# Patient Record
Sex: FEMALE | Race: WHITE | URBAN - METROPOLITAN AREA
[De-identification: names, ages, dates, MRNs, and addresses within clinical notes are randomized per-mention and may not be internally consistent; named-entity substitution may affect disease eponyms.]

---

## 2017-07-25 ENCOUNTER — OFFICE VISIT (OUTPATIENT)
Dept: INTERNAL MEDICINE CLINIC | Age: 22
End: 2017-07-25

## 2017-07-25 VITALS
BODY MASS INDEX: 23.95 KG/M2 | WEIGHT: 158 LBS | DIASTOLIC BLOOD PRESSURE: 70 MMHG | HEART RATE: 83 BPM | RESPIRATION RATE: 12 BRPM | SYSTOLIC BLOOD PRESSURE: 102 MMHG | OXYGEN SATURATION: 99 % | TEMPERATURE: 98 F | HEIGHT: 68 IN

## 2017-07-25 DIAGNOSIS — Z13.9 SCREENING: ICD-10-CM

## 2017-07-25 DIAGNOSIS — G89.29 CHRONIC MIDLINE LOW BACK PAIN WITHOUT SCIATICA: ICD-10-CM

## 2017-07-25 DIAGNOSIS — M54.50 CHRONIC MIDLINE LOW BACK PAIN WITHOUT SCIATICA: ICD-10-CM

## 2017-07-25 DIAGNOSIS — G43.109 MIGRAINE WITH AURA AND WITHOUT STATUS MIGRAINOSUS, NOT INTRACTABLE: Primary | ICD-10-CM

## 2017-07-25 DIAGNOSIS — Z83.3 FAMILY HISTORY OF DIABETES MELLITUS: ICD-10-CM

## 2017-07-25 LAB
ALBUMIN SERPL-MCNC: 4.6 G/DL (ref 3.4–5)
ALP BLD-CCNC: 51 U/L (ref 40–129)
ALT SERPL-CCNC: 9 U/L (ref 10–40)
ANION GAP SERPL CALCULATED.3IONS-SCNC: 14 MMOL/L (ref 3–16)
AST SERPL-CCNC: 14 U/L (ref 15–37)
BASOPHILS ABSOLUTE: 0 K/UL (ref 0–0.2)
BASOPHILS RELATIVE PERCENT: 0.6 %
BILIRUB SERPL-MCNC: 0.6 MG/DL (ref 0–1)
BILIRUBIN DIRECT: <0.2 MG/DL (ref 0–0.3)
BILIRUBIN, INDIRECT: ABNORMAL MG/DL (ref 0–1)
BUN BLDV-MCNC: 13 MG/DL (ref 7–20)
CALCIUM SERPL-MCNC: 9.5 MG/DL (ref 8.3–10.6)
CHLORIDE BLD-SCNC: 103 MMOL/L (ref 99–110)
CHOLESTEROL, TOTAL: 131 MG/DL (ref 0–199)
CO2: 25 MMOL/L (ref 21–32)
CREAT SERPL-MCNC: 0.7 MG/DL (ref 0.6–1.1)
EOSINOPHILS ABSOLUTE: 0 K/UL (ref 0–0.6)
EOSINOPHILS RELATIVE PERCENT: 0.9 %
GFR AFRICAN AMERICAN: >60
GFR NON-AFRICAN AMERICAN: >60
GLUCOSE BLD-MCNC: 81 MG/DL (ref 70–99)
HCT VFR BLD CALC: 39 % (ref 36–48)
HEMOGLOBIN: 12.9 G/DL (ref 12–16)
HEPATITIS C ANTIBODY INTERPRETATION: NORMAL
LYMPHOCYTES ABSOLUTE: 1.4 K/UL (ref 1–5.1)
LYMPHOCYTES RELATIVE PERCENT: 26 %
MCH RBC QN AUTO: 28.5 PG (ref 26–34)
MCHC RBC AUTO-ENTMCNC: 33.1 G/DL (ref 31–36)
MCV RBC AUTO: 86.2 FL (ref 80–100)
MONOCYTES ABSOLUTE: 0.5 K/UL (ref 0–1.3)
MONOCYTES RELATIVE PERCENT: 9.8 %
NEUTROPHILS ABSOLUTE: 3.3 K/UL (ref 1.7–7.7)
NEUTROPHILS RELATIVE PERCENT: 62.7 %
PDW BLD-RTO: 15 % (ref 12.4–15.4)
PLATELET # BLD: 184 K/UL (ref 135–450)
PMV BLD AUTO: 10.6 FL (ref 5–10.5)
POTASSIUM SERPL-SCNC: 4.4 MMOL/L (ref 3.5–5.1)
RBC # BLD: 4.53 M/UL (ref 4–5.2)
SODIUM BLD-SCNC: 142 MMOL/L (ref 136–145)
TOTAL PROTEIN: 8.2 G/DL (ref 6.4–8.2)
TSH REFLEX: 1.98 UIU/ML (ref 0.27–4.2)
VITAMIN D 25-HYDROXY: 27.8 NG/ML
WBC # BLD: 5.3 K/UL (ref 4–11)

## 2017-07-25 PROCEDURE — 99203 OFFICE O/P NEW LOW 30 MIN: CPT | Performed by: INTERNAL MEDICINE

## 2017-07-26 LAB
ESTIMATED AVERAGE GLUCOSE: 102.5 MG/DL
HBA1C MFR BLD: 5.2 %
HIV-1 AND HIV-2 ANTIBODIES: NORMAL

## 2017-10-31 ENCOUNTER — OFFICE VISIT (OUTPATIENT)
Dept: INTERNAL MEDICINE CLINIC | Age: 22
End: 2017-10-31

## 2017-10-31 VITALS
OXYGEN SATURATION: 97 % | WEIGHT: 158.4 LBS | DIASTOLIC BLOOD PRESSURE: 78 MMHG | SYSTOLIC BLOOD PRESSURE: 104 MMHG | HEART RATE: 78 BPM | TEMPERATURE: 98 F | HEIGHT: 68 IN | BODY MASS INDEX: 24.01 KG/M2

## 2017-10-31 DIAGNOSIS — G43.109 MIGRAINE WITH AURA AND WITHOUT STATUS MIGRAINOSUS, NOT INTRACTABLE: Primary | ICD-10-CM

## 2017-10-31 DIAGNOSIS — Z83.3 FAMILY HISTORY OF DIABETES MELLITUS: ICD-10-CM

## 2017-10-31 DIAGNOSIS — M54.50 CHRONIC MIDLINE LOW BACK PAIN WITHOUT SCIATICA: ICD-10-CM

## 2017-10-31 DIAGNOSIS — E55.9 VITAMIN D DEFICIENCY: ICD-10-CM

## 2017-10-31 DIAGNOSIS — Z23 NEED FOR IMMUNIZATION AGAINST INFLUENZA: ICD-10-CM

## 2017-10-31 DIAGNOSIS — G89.29 CHRONIC MIDLINE LOW BACK PAIN WITHOUT SCIATICA: ICD-10-CM

## 2017-10-31 PROCEDURE — 99214 OFFICE O/P EST MOD 30 MIN: CPT | Performed by: INTERNAL MEDICINE

## 2017-10-31 PROCEDURE — 90471 IMMUNIZATION ADMIN: CPT | Performed by: INTERNAL MEDICINE

## 2017-10-31 PROCEDURE — 90630 INFLUENZA, QUADV, 18-64 YRS, ID, PF, MICRO INJ, 0.1ML (FLUZONE QUADV, PF): CPT | Performed by: INTERNAL MEDICINE

## 2017-10-31 RX ORDER — SUMATRIPTAN 50 MG/1
50 TABLET, FILM COATED ORAL
Qty: 9 TABLET | Refills: 3 | Status: SHIPPED | OUTPATIENT
Start: 2017-10-31 | End: 2018-03-07 | Stop reason: SDUPTHER

## 2017-10-31 ASSESSMENT — PATIENT HEALTH QUESTIONNAIRE - PHQ9
2. FEELING DOWN, DEPRESSED OR HOPELESS: 0
1. LITTLE INTEREST OR PLEASURE IN DOING THINGS: 0
SUM OF ALL RESPONSES TO PHQ9 QUESTIONS 1 & 2: 0
SUM OF ALL RESPONSES TO PHQ QUESTIONS 1-9: 0

## 2017-10-31 NOTE — PROGRESS NOTES
Chronic midline low back pain without sciatica  COUNSELLED. IMPROVING. EXERCISES. ANALGESICS PRN. MONITOR. HOME EXERCISES  MAKE CHANGES AS NEEDED. 3. Vitamin D deficiency  COUNSELLED. ADVISED START ON VIT D 1000 U DAILY. MONITOR AND MAKE CHANGES AS NEEDED. 4. Family history of diabetes mellitus  COUNSELLED. LABS -VE FOR DM. ADVISED RISK FACTOR MODIFICATION. MAKE CHANGES AS NEEDED. 5. Need for immunization against influenza  COUNSELLED. S/E D/W PT. FLU VACCINE GIVEN. PT TOLERATED. RI IMMUNIZ ADMIN,1 SINGLE/COMB VAC/TOXOID  INFLUENZA, QUADV, 18-64 YRS, ID, PF, MICRO INJ, 0.1ML (FLUZONE QUADV, PF               MORE THAN HALF THE TIME SPENT ON COUNSELLING    Elise received counseling on the following healthy behaviors: nutrition, exercise and medication adherence    Patient given educational materials on Nutrition and Exercise    I have instructed Germain Hernández to complete a self tracking handout on [employer] and instructed them to bring it with them to her next appointment. Discussed use, benefit, and side effects of prescribed medications. Barriers to medication compliance addressed. All patient questions answered. Pt voiced understanding.           MEDICATION SIDE EFFECTS D/W PATIENT    PT STABLE AT TIME OF D/C.    RETURN TO CLINIC WITHIN 3 MONTHS / PRN

## 2018-03-07 ENCOUNTER — OFFICE VISIT (OUTPATIENT)
Dept: INTERNAL MEDICINE CLINIC | Age: 23
End: 2018-03-07

## 2018-03-07 VITALS
SYSTOLIC BLOOD PRESSURE: 108 MMHG | HEIGHT: 69 IN | DIASTOLIC BLOOD PRESSURE: 80 MMHG | WEIGHT: 163 LBS | OXYGEN SATURATION: 98 % | BODY MASS INDEX: 24.14 KG/M2 | HEART RATE: 84 BPM

## 2018-03-07 DIAGNOSIS — G43.109 MIGRAINE WITH AURA AND WITHOUT STATUS MIGRAINOSUS, NOT INTRACTABLE: Primary | ICD-10-CM

## 2018-03-07 DIAGNOSIS — M54.50 CHRONIC MIDLINE LOW BACK PAIN WITHOUT SCIATICA: ICD-10-CM

## 2018-03-07 DIAGNOSIS — Z83.3 FAMILY HISTORY OF DIABETES MELLITUS: ICD-10-CM

## 2018-03-07 DIAGNOSIS — E55.9 VITAMIN D DEFICIENCY: ICD-10-CM

## 2018-03-07 DIAGNOSIS — G89.29 CHRONIC MIDLINE LOW BACK PAIN WITHOUT SCIATICA: ICD-10-CM

## 2018-03-07 LAB
ANION GAP SERPL CALCULATED.3IONS-SCNC: 14 MMOL/L (ref 3–16)
BUN BLDV-MCNC: 10 MG/DL (ref 7–20)
CALCIUM SERPL-MCNC: 9.3 MG/DL (ref 8.3–10.6)
CHLORIDE BLD-SCNC: 105 MMOL/L (ref 99–110)
CO2: 27 MMOL/L (ref 21–32)
CREAT SERPL-MCNC: 0.7 MG/DL (ref 0.6–1.1)
GFR AFRICAN AMERICAN: >60
GFR NON-AFRICAN AMERICAN: >60
GLUCOSE BLD-MCNC: 80 MG/DL (ref 70–99)
POTASSIUM SERPL-SCNC: 4.5 MMOL/L (ref 3.5–5.1)
SODIUM BLD-SCNC: 146 MMOL/L (ref 136–145)
VITAMIN D 25-HYDROXY: 33 NG/ML

## 2018-03-07 PROCEDURE — 99214 OFFICE O/P EST MOD 30 MIN: CPT | Performed by: INTERNAL MEDICINE

## 2018-03-07 RX ORDER — SUMATRIPTAN 50 MG/1
50 TABLET, FILM COATED ORAL
Qty: 9 TABLET | Refills: 3 | Status: SHIPPED | OUTPATIENT
Start: 2018-03-07 | End: 2018-11-13 | Stop reason: SDUPTHER

## 2018-03-07 NOTE — PROGRESS NOTES
SUBJECTIVE:  Rosie Dutta is a 23 y.o. female [address] [place]   Chief Complaint   Patient presents with    Migraine        PT HERE FOR EVAL    MIGRAINE HA - + AURA. MOSTLY RT FRONTOTEMPORAL. + THROBBING, NO RAD, + PHOTOPHOBIA, OCC PHONOPHOBIA. LAST EPISODE  > 1 MONTH AGO  VIT D DEF - LAB D/W PT  LOW BACK PAIN - CHRONIC. MIDLINE. IMPROVED. PAIN FREE TODAY  + FH DM  - LABS -VE FOR DM    DENIES CP, No SOB, No PALPITATIONS, No COUGH  No ABD PAIN, No N/V, No DIARRHEA, No CONSTIPATION, No MELENA, No HEMATOCHEZIA. No DYSURIA, NoFREQ, No URGENCY, NoHEMATURIA    PMH: REVIEWED    PSH: REVIEWED:    ALLERGY:  Patient has no known allergies. MEDS: REVIEWED  Prior to Visit Medications    Medication Sig Taking? Authorizing Provider   Cholecalciferol (VITAMIN D3) 1000 units CAPS Take 1 capsule by mouth daily  Inocencio Cortez MD   SUMAtriptan (IMITREX) 50 MG tablet Take 1 tablet by mouth every 2 hours as needed for Migraine  Inocencio Cortez MD       ROS: COMPREHENSIVE ROS AS IN HX, REST -VE  History obtained from chart review and the patient    OBJECTIVE:   NURSING NOTE AND VITALS REVIEWED  /80 (Site: Left Arm, Position: Sitting, Cuff Size: Medium Adult)   Pulse 100   Ht 5' 9\" (1.753 m)   Wt 163 lb (73.9 kg)   LMP 02/27/2018 (Exact Date)   SpO2 98%   Breastfeeding? No   BMI 24.07 kg/m²     NO ACUTE DISTRESS    REPEAT BP:  108/80 (LT), NO ORTHOSTASIS     REPEAT PULSE: 84 - MANUAL    Body mass index is 24.07 kg/m². HEENT: NO PALLOR, ANICTERIC, PERRLA, EOMI, NO CONJUNCTIVAL ERYTHEMA,                 NO SINUS TENDERNESS  NECK:  SUPPLE, TRACHEA MIDLINE, NT, NO JVD, NO CB, NO LA, NO TM, NO STIFFNESS  CHEST: RESPY EFFORT NL, GOOD AE, NO W/R/C  HEART: S1S2+ REG, NO M/G/R  ABD: SOFT, NT, NO HSM, BS+  EXT: NO EDEMA, NT, PULSES +.  AIDAN'S -VE  NEURO: ALERT AND ORIENTED X 3, NO MENINGEAL SIGNS, NO TREMORS, NL GAIT, NO FOCAL DEFICITS  PSYCH: FAIRLY GOOD AFFECT  BACK: NT, NO ROM, NO CVA TENDERNESS    PREVIOUS LABS REVIEWED

## 2018-07-09 ENCOUNTER — OFFICE VISIT (OUTPATIENT)
Dept: INTERNAL MEDICINE CLINIC | Age: 23
End: 2018-07-09

## 2018-07-09 VITALS
DIASTOLIC BLOOD PRESSURE: 76 MMHG | SYSTOLIC BLOOD PRESSURE: 120 MMHG | BODY MASS INDEX: 24.83 KG/M2 | OXYGEN SATURATION: 99 % | HEART RATE: 73 BPM | WEIGHT: 163.8 LBS | HEIGHT: 68 IN

## 2018-07-09 DIAGNOSIS — M54.50 CHRONIC MIDLINE LOW BACK PAIN WITHOUT SCIATICA: ICD-10-CM

## 2018-07-09 DIAGNOSIS — J30.9 ALLERGIC RHINITIS, UNSPECIFIED CHRONICITY, UNSPECIFIED SEASONALITY, UNSPECIFIED TRIGGER: ICD-10-CM

## 2018-07-09 DIAGNOSIS — G43.109 MIGRAINE WITH AURA AND WITHOUT STATUS MIGRAINOSUS, NOT INTRACTABLE: Primary | ICD-10-CM

## 2018-07-09 DIAGNOSIS — G89.29 CHRONIC MIDLINE LOW BACK PAIN WITHOUT SCIATICA: ICD-10-CM

## 2018-07-09 DIAGNOSIS — E55.9 VITAMIN D DEFICIENCY: ICD-10-CM

## 2018-07-09 PROCEDURE — 99214 OFFICE O/P EST MOD 30 MIN: CPT | Performed by: INTERNAL MEDICINE

## 2018-07-09 RX ORDER — LORATADINE 10 MG/1
10 TABLET ORAL DAILY PRN
Qty: 30 TABLET | Refills: 3 | Status: SHIPPED | OUTPATIENT
Start: 2018-07-09 | End: 2018-08-08

## 2018-07-09 NOTE — PROGRESS NOTES
NO CVA TENDERNESS    PREVIOUS LABS REVIEWED AND D/W PT       ASSESSMENT / PLAN:     Diagnosis Orders   1. Migraine with aura and without status migrainosus, not intractable  COUNSELLED. STABLE. IMITREX PRN - HAS MED. MONITOR  MAKE CHANGES AS NEEDED. 2. Vitamin D deficiency  COUNSELLED. MONITOR ON VIT D SUPPLEMENT. MAKE CHANGES AS NEEDED. 3. Chronic midline low back pain without sciatica  COUNSELLED. Kami Grace EXERCISES. ANALGESICS PRN. MONITOR. PT DEFERRED MED  ADVISED ON HOME EXERCISES  MAKE CHANGES AS NEEDED. 4. Allergic rhinitis, unspecified chronicity, unspecified seasonality, unspecified trigger  COUNSELLED. START ON CLARITIN 10 MG DAILY / PRN. MONITOR. MAKE CHANGES AS NEEDED. PT DEFERRED TDAP - MAY HAVE HAD PREVIOUSLY - ADVISED TO BRING IN IMMUNIZATION REPORT      Eusebio Barnes received counseling on the following healthy behaviors: nutrition, exercise and medication adherence    Patient given educational materials on Nutrition and Exercise    I have instructed Eusebio Barnes to complete a self tracking handout on [employer] and instructed them to bring it with them to her next appointment. Discussed use, benefit, and side effects of prescribed medications. Barriers to medication compliance addressed. All patient questions answered. Pt voiced understanding.            MEDICATION SIDE EFFECTS D/W PATIENT    PT STABLE AT TIME OF D/C.    RETURN TO CLINIC WITHIN 4 MONTHS / PRN

## 2018-07-17 DIAGNOSIS — E55.9 VITAMIN D DEFICIENCY: ICD-10-CM

## 2018-07-18 ENCOUNTER — OFFICE VISIT (OUTPATIENT)
Dept: INTERNAL MEDICINE CLINIC | Age: 23
End: 2018-07-18

## 2018-07-18 VITALS
WEIGHT: 166.8 LBS | TEMPERATURE: 97.6 F | OXYGEN SATURATION: 96 % | HEIGHT: 68 IN | BODY MASS INDEX: 25.28 KG/M2 | SYSTOLIC BLOOD PRESSURE: 110 MMHG | HEART RATE: 84 BPM | DIASTOLIC BLOOD PRESSURE: 70 MMHG

## 2018-07-18 DIAGNOSIS — D72.828 OTHER ELEVATED WHITE BLOOD CELL (WBC) COUNT: ICD-10-CM

## 2018-07-18 DIAGNOSIS — J30.89 NON-SEASONAL ALLERGIC RHINITIS DUE TO OTHER ALLERGIC TRIGGER, UNSPECIFIED CHRONICITY: ICD-10-CM

## 2018-07-18 DIAGNOSIS — M60.08 INFECTIVE MYOSITIS OF OTHER SITE: Primary | ICD-10-CM

## 2018-07-18 DIAGNOSIS — M54.50 CHRONIC MIDLINE LOW BACK PAIN WITHOUT SCIATICA: ICD-10-CM

## 2018-07-18 DIAGNOSIS — G89.29 CHRONIC MIDLINE LOW BACK PAIN WITHOUT SCIATICA: ICD-10-CM

## 2018-07-18 DIAGNOSIS — E55.9 VITAMIN D DEFICIENCY: ICD-10-CM

## 2018-07-18 DIAGNOSIS — G43.109 MIGRAINE WITH AURA AND WITHOUT STATUS MIGRAINOSUS, NOT INTRACTABLE: ICD-10-CM

## 2018-07-18 PROBLEM — J30.9 ALLERGIC RHINITIS DUE TO ALLERGEN: Status: ACTIVE | Noted: 2018-07-18

## 2018-07-18 PROCEDURE — 99496 TRANSJ CARE MGMT HIGH F2F 7D: CPT | Performed by: INTERNAL MEDICINE

## 2018-07-18 PROCEDURE — 1111F DSCHRG MED/CURRENT MED MERGE: CPT | Performed by: INTERNAL MEDICINE

## 2018-07-18 RX ORDER — AMOXICILLIN AND CLAVULANATE POTASSIUM 875; 125 MG/1; MG/1
1 TABLET, FILM COATED ORAL 2 TIMES DAILY
COMMUNITY
End: 2018-11-13 | Stop reason: ALTCHOICE

## 2018-07-18 RX ORDER — IBUPROFEN 800 MG/1
800 TABLET ORAL EVERY 8 HOURS PRN
Qty: 60 TABLET | Refills: 0 | Status: SHIPPED | OUTPATIENT
Start: 2018-07-18 | End: 2018-08-17

## 2018-07-18 RX ORDER — PREDNISONE 10 MG/1
10 TABLET ORAL DAILY
COMMUNITY
End: 2018-07-18 | Stop reason: DRUGHIGH

## 2018-07-18 RX ORDER — PREDNISONE 20 MG/1
20 TABLET ORAL
COMMUNITY
End: 2018-11-13 | Stop reason: ALTCHOICE

## 2018-07-18 NOTE — PATIENT INSTRUCTIONS
TAKE MED AS ADVISED    DIET/ EXERCISE.     FOLLOW UP WITHIN 4-6 WEEKS / AS NEEDED    FOLLOW UP FOR LAB

## 2018-07-18 NOTE — PROGRESS NOTES
course: Discharge summary reviewed- see chart. Chief Complaint   Patient presents with    Follow-up     F/U from hospital     History of Present illness - Follow up of Hospital diagnosis(es): [facility] 81 / S/P HOSPITALIZATION    PT ADMITTED TO [facility] WITH ACUTE ONSET LT SIDED NECK PAIN AND SWELLING . SHARP PAIN, INCREASED WITH MVT AND CHEWING, PAIN SCALE 9-10/10 @ MAX. NOW 5/10. + FEVER, LEUKOCYTOSIS. NO H/O TRAUMA. PT DIAGNOSED WITH INFECTIVE MYOSITIS S/P CT. TREATED WITH IV ANTIBIOTICS, STEROIDS, ANALGESICS  PT D/KAYCE HOME ON AUGMENTIN X 14 DAYS, PREDNISONE. STATES PAIN IMPROVING. FEVER RESOLVED. MIGRAINE HA - + AURA. DOING WELL. NO RECENT EPISODE    VIT D DEF - TAKING MED. LAB D/W PT  LOW BACK PAIN - CHRONIC. MIDLINE. INTERMITTENT. ? Jorge Pardo. PAIN FREE AT THIS TIME  ALLERGIC RHINITIS - NO NASAL CONGESTION - ? DURATION. ? POSTNASAL DRAINAGE , NO SINUS PRESSURE, NO HA, OCC SNEEZING, NO WATERY ITCHY EYES.       DENIES CP, No SOB, No PALPITATIONS, No COUGH  No ABD PAIN, No N/V, No DIARRHEA, No CONSTIPATION, No MELENA, No HEMATOCHEZIA. No DYSURIA, No FREQ, No URGENCY, No HEMATURIA      ROS: COMPREHENSIVE ROS AS IN HX, REST -VE  History obtained from chart review and the patient        Non face to face  following discharge, date last encounter closed (first attempt may have been earlier):  pt seen within 48 hrs of D/C    Call initiated 2 business days of discharge:  pt seen within 48 hrs of D/C    Interval history/Current status: STABLE    Physical Exam: SEE BELOW      OBJECTIVE:   NURSING NOTE AND VITALS REVIEWED  /64 (Site: Left Arm, Position: Sitting, Cuff Size: Medium Adult)   Pulse 93   Temp 97.6 °F (36.4 °C)   Ht 5' 8\" (1.727 m)   Wt 166 lb 12.8 oz (75.7 kg)   LMP 06/16/2018   SpO2 96%   Breastfeeding?  No   BMI 25.36 kg/m²     NO ACUTE DISTRESS    REPEAT BP: 110/70 (LT), NO ORTHOSTASIS     REPEAT PULSE: 84 - MANUAL    Body mass index is 25.36 kg/m².     HEENT: NO PALLOR, ANICTERIC, PERRLA, EOMI, NO CONJUNCTIVAL ERYTHEMA,                 NO SINUS TENDERNESS  NECK:  SUPPLE, TRACHEA MIDLINE, + TENDERNESS POST LAT LT NECK, + OCC PAIN WITH MVT, + ROM, MILD SWELLING LT POST LAT, NO ERYTHEMA, NO WARMTH, , NO JVD, NO CB, NO LA, NO TM  CHEST: RESPY EFFORT NL, GOOD AE, NO W/R/C  HEART: S1S2+ REG, NO M/G/R  ABD: SOFT, NT, NO HSM, BS+  EXT: NO EDEMA, NT, PULSES +. AIDAN'S -VE  NEURO: ALERT AND ORIENTED X 3, NO MENINGEAL SIGNS, NO TREMORS, NL GAIT, NO FOCAL DEFICITS  PSYCH: FAIRLY GOOD AFFECT  BACK: NT, NO ROM, NO CVA TENDERNESS    PREVIOUS LABS / OUTSIDE LABS / CT REVIEWED AND D/W PT        Assessment/Plan:     Diagnosis Orders   1. 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 COMPETE ANTIBIOTIC, STEROID TAPER  IBUPROFEN 80 MG PRN  WITH FOOD FOR NECK PAIN  WARM COMPRESS PRN  MONITOR LABS  CA DISCHARGE MEDS RECONCILED W/ CURRENT OUTPATIENT MED LIST  MAKE CHANGES AS NEEDED. 2. Other elevated white blood cell (WBC) count  COUNSELLED. RELATED TO ABOVE. F/U LAB TO [facility] AFTER COMPLETING ANTIBIOTIC. MONITOR  MAKE CHANGES AS NEEDED. 3. Migraine with aura and without status migrainosus, not intractable  COUNSELLED. STABLE. MED PRN  MAKE CHANGES AS NEEDED. 4. Vitamin D deficiency  COUNSELLED. ADVISED MED COMPLIANCE - ADVISED VIT D 1000 U DAILY. MONITOR AND MAKE CHANGES AS NEEDED. 5. Chronic midline low back pain without sciatica  COUNSELLED. STABLE. ANALGESICS PRN. MONITOR  MAKE CHANGES AS NEEDED. 6. Non-seasonal allergic rhinitis due to other allergic trigger, unspecified chronicity  COUNSELLED. MED PRN.  MONITOR  MAKE CHANGES AS NEEDED.            **  HOSPITAL COURSE AND MED RECONCILIATION REVIEWED AND D/W PT  ADVISED ON COMPLIANCE WITH MEDS, RECOMMENDATIONS  SEE ABOVE  PT VERBALIZED UNDERSTANDING          MEDICATION SIDE EFFECTS D/W PATIENT    PT STABLE AT TIME OF D/C.    RETURN TO CLINIC WITHIN  4-6 WEEKS / PRN    FOLLOW UP FOR LAB

## 2018-11-13 ENCOUNTER — OFFICE VISIT (OUTPATIENT)
Dept: INTERNAL MEDICINE CLINIC | Age: 23
End: 2018-11-13
Payer: COMMERCIAL

## 2018-11-13 VITALS
OXYGEN SATURATION: 95 % | HEIGHT: 68 IN | HEART RATE: 84 BPM | SYSTOLIC BLOOD PRESSURE: 120 MMHG | BODY MASS INDEX: 25.31 KG/M2 | DIASTOLIC BLOOD PRESSURE: 68 MMHG | TEMPERATURE: 98.5 F | WEIGHT: 167 LBS

## 2018-11-13 DIAGNOSIS — M60.08 INFECTIVE MYOSITIS OF OTHER SITE: ICD-10-CM

## 2018-11-13 DIAGNOSIS — J30.89 OTHER ALLERGIC RHINITIS: ICD-10-CM

## 2018-11-13 DIAGNOSIS — G43.109 MIGRAINE WITH AURA AND WITHOUT STATUS MIGRAINOSUS, NOT INTRACTABLE: Primary | ICD-10-CM

## 2018-11-13 DIAGNOSIS — Z23 NEED FOR IMMUNIZATION AGAINST INFLUENZA: ICD-10-CM

## 2018-11-13 DIAGNOSIS — E55.9 VITAMIN D DEFICIENCY: ICD-10-CM

## 2018-11-13 LAB
ANION GAP SERPL CALCULATED.3IONS-SCNC: 14 MMOL/L (ref 3–16)
BASOPHILS ABSOLUTE: 0 K/UL (ref 0–0.2)
BASOPHILS RELATIVE PERCENT: 0.5 %
BUN BLDV-MCNC: 10 MG/DL (ref 7–20)
CALCIUM SERPL-MCNC: 9.3 MG/DL (ref 8.3–10.6)
CHLORIDE BLD-SCNC: 104 MMOL/L (ref 99–110)
CO2: 24 MMOL/L (ref 21–32)
CREAT SERPL-MCNC: 0.8 MG/DL (ref 0.6–1.1)
EOSINOPHILS ABSOLUTE: 0 K/UL (ref 0–0.6)
EOSINOPHILS RELATIVE PERCENT: 0.2 %
GFR AFRICAN AMERICAN: >60
GFR NON-AFRICAN AMERICAN: >60
GLUCOSE BLD-MCNC: 80 MG/DL (ref 70–99)
HCT VFR BLD CALC: 35.6 % (ref 36–48)
HEMOGLOBIN: 11.6 G/DL (ref 12–16)
LYMPHOCYTES ABSOLUTE: 2.3 K/UL (ref 1–5.1)
LYMPHOCYTES RELATIVE PERCENT: 31.5 %
MCH RBC QN AUTO: 27.1 PG (ref 26–34)
MCHC RBC AUTO-ENTMCNC: 32.6 G/DL (ref 31–36)
MCV RBC AUTO: 83.2 FL (ref 80–100)
MONOCYTES ABSOLUTE: 0.7 K/UL (ref 0–1.3)
MONOCYTES RELATIVE PERCENT: 9.4 %
NEUTROPHILS ABSOLUTE: 4.3 K/UL (ref 1.7–7.7)
NEUTROPHILS RELATIVE PERCENT: 58.4 %
PDW BLD-RTO: 15.1 % (ref 12.4–15.4)
PLATELET # BLD: 200 K/UL (ref 135–450)
PMV BLD AUTO: 10.5 FL (ref 5–10.5)
POTASSIUM SERPL-SCNC: 3.8 MMOL/L (ref 3.5–5.1)
RBC # BLD: 4.28 M/UL (ref 4–5.2)
SODIUM BLD-SCNC: 142 MMOL/L (ref 136–145)
VITAMIN D 25-HYDROXY: 37.8 NG/ML
WBC # BLD: 7.3 K/UL (ref 4–11)

## 2018-11-13 PROCEDURE — 90471 IMMUNIZATION ADMIN: CPT | Performed by: INTERNAL MEDICINE

## 2018-11-13 PROCEDURE — 90686 IIV4 VACC NO PRSV 0.5 ML IM: CPT | Performed by: INTERNAL MEDICINE

## 2018-11-13 PROCEDURE — 99214 OFFICE O/P EST MOD 30 MIN: CPT | Performed by: INTERNAL MEDICINE

## 2018-11-13 RX ORDER — SUMATRIPTAN 50 MG/1
50 TABLET, FILM COATED ORAL
Qty: 9 TABLET | Refills: 3 | Status: SHIPPED | OUTPATIENT
Start: 2018-11-13

## 2018-11-13 ASSESSMENT — PATIENT HEALTH QUESTIONNAIRE - PHQ9
1. LITTLE INTEREST OR PLEASURE IN DOING THINGS: 0
SUM OF ALL RESPONSES TO PHQ QUESTIONS 1-9: 0
SUM OF ALL RESPONSES TO PHQ QUESTIONS 1-9: 0
SUM OF ALL RESPONSES TO PHQ9 QUESTIONS 1 & 2: 0
2. FEELING DOWN, DEPRESSED OR HOPELESS: 0